# Patient Record
Sex: FEMALE | Race: WHITE | NOT HISPANIC OR LATINO
[De-identification: names, ages, dates, MRNs, and addresses within clinical notes are randomized per-mention and may not be internally consistent; named-entity substitution may affect disease eponyms.]

---

## 2021-07-07 ENCOUNTER — APPOINTMENT (OUTPATIENT)
Dept: DERMATOLOGY | Facility: CLINIC | Age: 66
End: 2021-07-07
Payer: MEDICARE

## 2021-07-07 DIAGNOSIS — L30.9 DERMATITIS, UNSPECIFIED: ICD-10-CM

## 2021-07-07 PROCEDURE — 99072 ADDL SUPL MATRL&STAF TM PHE: CPT

## 2021-07-07 PROCEDURE — 99203 OFFICE O/P NEW LOW 30 MIN: CPT

## 2021-07-07 RX ORDER — TRIAMCINOLONE ACETONIDE 1 MG/G
0.1 OINTMENT TOPICAL
Qty: 1 | Refills: 1 | Status: ACTIVE | COMMUNITY
Start: 2021-07-07 | End: 1900-01-01

## 2021-11-18 ENCOUNTER — APPOINTMENT (OUTPATIENT)
Dept: UROLOGY | Facility: CLINIC | Age: 66
End: 2021-11-18
Payer: MEDICARE

## 2021-11-18 VITALS
OXYGEN SATURATION: 100 % | TEMPERATURE: 97.3 F | HEIGHT: 64 IN | BODY MASS INDEX: 30.56 KG/M2 | DIASTOLIC BLOOD PRESSURE: 80 MMHG | WEIGHT: 179 LBS | SYSTOLIC BLOOD PRESSURE: 123 MMHG | HEART RATE: 61 BPM

## 2021-11-18 DIAGNOSIS — R31.21 ASYMPTOMATIC MICROSCOPIC HEMATURIA: ICD-10-CM

## 2021-11-18 LAB
BILIRUB UR QL STRIP: NORMAL
CLARITY UR: CLEAR
COLLECTION METHOD: NORMAL
GLUCOSE UR-MCNC: NORMAL
HCG UR QL: 0.2 EU/DL
HGB UR QL STRIP.AUTO: NORMAL
KETONES UR-MCNC: NORMAL
LEUKOCYTE ESTERASE UR QL STRIP: NORMAL
NITRITE UR QL STRIP: NORMAL
PH UR STRIP: 5.5
PROT UR STRIP-MCNC: NORMAL
SP GR UR STRIP: >=1.03

## 2021-11-18 PROCEDURE — 81003 URINALYSIS AUTO W/O SCOPE: CPT | Mod: QW

## 2021-11-18 PROCEDURE — 99204 OFFICE O/P NEW MOD 45 MIN: CPT

## 2021-11-18 PROCEDURE — 51798 US URINE CAPACITY MEASURE: CPT

## 2021-11-18 NOTE — ADDENDUM
[FreeTextEntry1] : A portion of this note was written by [Kalyn Chavez] on 11/18/2021 acting as a scribe for Dr. Hilliard. \par \par I have personally reviewed the chart and agree that the record accurately reflects my personal performance of the history, physical exam, assessment, and plan.

## 2021-11-18 NOTE — ASSESSMENT
[FreeTextEntry1] : Pt is a 65 yo F with a hx of UTIs and microscopic hematuria.  Also with hx of cystocele/sling placement in 2010, being sent for microscopic hematuria evaluation.   Microheme evaluation was discussed with the pt in detail. Today's urine was sent for culture and cytology. Pt will f/u for a CT urogram and follow-up with me for a cystoscopy in 1-2 weeks. \par \par Additionally, she will have the operative notes, (from Dr. Higgins's office), sent for my review.  \par \par Patient expressed understanding.

## 2021-11-18 NOTE — LETTER BODY
[Dear  ___] : Dear  [unfilled], [Consult Letter:] : I had the pleasure of evaluating your patient, [unfilled]. [Please see my note below.] : Please see my note below. [Consult Closing:] : Thank you very much for allowing me to participate in the care of this patient.  If you have any questions, please do not hesitate to contact me. [Sincerely,] : Sincerely, [FreeTextEntry3] : Dr. Deisy Hilliard

## 2021-11-18 NOTE — PHYSICAL EXAM
[General Appearance - Well Developed] : well developed [General Appearance - Well Nourished] : well nourished [Normal Appearance] : normal appearance [Well Groomed] : well groomed [General Appearance - In No Acute Distress] : no acute distress [Edema] : no peripheral edema [Respiration, Rhythm And Depth] : normal respiratory rhythm and effort [Exaggerated Use Of Accessory Muscles For Inspiration] : no accessory muscle use [Abdomen Soft] : soft [Abdomen Tenderness] : non-tender [Costovertebral Angle Tenderness] : no ~M costovertebral angle tenderness [Urinary Bladder Findings] : the bladder was normal on palpation [Normal Station and Gait] : the gait and station were normal for the patient's age [] : no rash [No Focal Deficits] : no focal deficits [Oriented To Time, Place, And Person] : oriented to person, place, and time [Affect] : the affect was normal [Mood] : the mood was normal [Not Anxious] : not anxious [No Palpable Adenopathy] : no palpable adenopathy [FreeTextEntry1] : no prolapse, R anterior vaginal wall fibrous on palpation likely ?benched mesh, no ALEK on SCST, non tender

## 2021-11-18 NOTE — HISTORY OF PRESENT ILLNESS
[FreeTextEntry1] : Pt is a 65 yo female  with a hx of recurrent UTIs, former pt of Dr. Higgins presenting today with microhematuria, referred by Dr. Vines.   Her pertinent surgical history includes a history of ?cystocele repair and sling with mesh in .  She has not undergone upper tract imaging or a cystoscopy in many years.  She also reports slow urinary flow which has worsened over the last 1-2 years.  Denies daytime urinary frequency.  States she drinks very little and has low urinary volume.  \par \par Dr. Vines treats her recurrent UTIs -used to occur ~9 times/year, now 1-2 times per year with use of cranberry supplements \par \par Udip: (+) moderate blood, trace omayra. \par PVR: 67 cc (to rule out incomplete bladder emptying)\par \par PMH: cerumen impaction, dermatitis\par PSH: cystocele repair/sling, broken foot, knee replacement\par FH: no  malignancies, kidney renal calculi (sister)\par SH: former smoker, social alcohol, , retired\par \par Habits:\par \par Allergies: NKDA \par Meds: clenpiq, linzess, notrofurantoin, meprazole, rabeprazole, triamcinolone

## 2021-11-22 LAB
APPEARANCE: CLEAR
BACTERIA UR CULT: NORMAL
BACTERIA: NEGATIVE
BILIRUBIN URINE: NEGATIVE
BLOOD URINE: NORMAL
COLOR: YELLOW
GLUCOSE QUALITATIVE U: NEGATIVE
HYALINE CASTS: 0 /LPF
KETONES URINE: NEGATIVE
LEUKOCYTE ESTERASE URINE: ABNORMAL
MICROSCOPIC-UA: NORMAL
NITRITE URINE: NEGATIVE
PH URINE: 6
PROTEIN URINE: NORMAL
RED BLOOD CELLS URINE: 1 /HPF
SPECIFIC GRAVITY URINE: 1.03
SQUAMOUS EPITHELIAL CELLS: 2 /HPF
UROBILINOGEN URINE: NORMAL
WHITE BLOOD CELLS URINE: 6 /HPF

## 2021-11-23 ENCOUNTER — APPOINTMENT (OUTPATIENT)
Dept: UROLOGY | Facility: CLINIC | Age: 66
End: 2021-11-23
Payer: MEDICARE

## 2021-11-23 LAB — URINE CYTOLOGY: NORMAL

## 2021-11-23 PROCEDURE — 99213 OFFICE O/P EST LOW 20 MIN: CPT | Mod: 25

## 2021-11-23 PROCEDURE — 51701 INSERT BLADDER CATHETER: CPT

## 2021-11-23 NOTE — HISTORY OF PRESENT ILLNESS
[FreeTextEntry1] : Pt is a 65 yo female  with a hx of recurrent UTIs, former pt of Dr. Higgins with microhematuria, referred by Dr. Vines. She returns today  for a straight-cath specimen following a contaminated urine culture. \par   Her pertinent surgical history includes a history of ?cystocele repair and sling with mesh in .  She has not undergone upper tract imaging or a cystoscopy in many years.  She also reports slow urinary flow which has worsened over the last 1-2 years.  Denies daytime urinary frequency.  States she drinks very little and has low urinary volume.  \par \par Dr. Vines treats her recurrent UTIs -used to occur ~9 times/year, now 1-2 times per year with use of cranberry supplements \par \par Udip: (+) moderate blood, trace omayra. \par PVR: 67 cc (to rule out incomplete bladder emptying)\par \par PMH: cerumen impaction, dermatitis\par PSH: cystocele repair/sling, broken foot, knee replacement\par FH: no  malignancies, kidney renal calculi (sister)\par SH: former smoker, social alcohol, , retired\par \par Habits:\par \par Allergies: NKDA \par Meds: clenpiq, linzess, notrofurantoin, meprazole, rabeprazole, triamcinolone

## 2021-11-23 NOTE — ASSESSMENT
[FreeTextEntry1] : Pt is a 67 yo F with a hx of UTIs and microscopic hematuria.  Also with hx of cystocele/sling placement in 2010, being sent for microscopic hematuria evaluation. Pt returns to provide a straight- cath specimen. Urine was sent for culture. Pt will f/u for a CT urogram and follow-up with me for a cystoscopy in 1-2 weeks. \par \par Additionally, she will have the operative notes, (from Dr. Higgins's office), sent for my review.  \par \par Patient expressed understanding.

## 2021-11-23 NOTE — PHYSICAL EXAM

## 2021-11-23 NOTE — ADDENDUM
[FreeTextEntry1] : A portion of this note was written by [Kalyn Chavez] on 11/23/2021 acting as a scribe for Dr. Hilliard. \par \par I have personally reviewed the chart and agree that the record accurately reflects my personal performance of the history, physical exam, assessment, and plan.

## 2021-11-29 LAB — BACTERIA UR CULT: NORMAL

## 2021-12-13 ENCOUNTER — NON-APPOINTMENT (OUTPATIENT)
Age: 66
End: 2021-12-13

## 2022-01-13 ENCOUNTER — APPOINTMENT (OUTPATIENT)
Dept: UROLOGY | Facility: CLINIC | Age: 67
End: 2022-01-13
Payer: MEDICARE

## 2022-01-13 VITALS
OXYGEN SATURATION: 97 % | SYSTOLIC BLOOD PRESSURE: 120 MMHG | TEMPERATURE: 97.1 F | HEART RATE: 59 BPM | DIASTOLIC BLOOD PRESSURE: 82 MMHG

## 2022-01-13 LAB
BILIRUB UR QL STRIP: NORMAL
CLARITY UR: CLEAR
COLLECTION METHOD: NORMAL
GLUCOSE UR-MCNC: NORMAL
HCG UR QL: 0.2 EU/DL
HGB UR QL STRIP.AUTO: NORMAL
KETONES UR-MCNC: NORMAL
LEUKOCYTE ESTERASE UR QL STRIP: NORMAL
NITRITE UR QL STRIP: NORMAL
PH UR STRIP: 5
PROT UR STRIP-MCNC: NORMAL
SP GR UR STRIP: 1.02

## 2022-01-13 PROCEDURE — 52000 CYSTOURETHROSCOPY: CPT

## 2022-01-13 PROCEDURE — 99214 OFFICE O/P EST MOD 30 MIN: CPT | Mod: 25

## 2022-01-13 PROCEDURE — 81003 URINALYSIS AUTO W/O SCOPE: CPT | Mod: QW

## 2022-01-18 LAB — BACTERIA UR CULT: NORMAL

## 2022-01-20 NOTE — HISTORY OF PRESENT ILLNESS
[FreeTextEntry1] : Pt is a 65 yo female  with a hx of recurrent UTIs, former pt of Dr. Higgins with microscopic hematuria, referred to me by Dr. Symone Vines. She returns today for a cystoscopy as part of her microscopic hematuria workup. \par   \par Her pertinent surgical history includes a history of ?cystocele repair and sling with mesh in .  She has not undergone upper tract imaging or a cystoscopy in many years.  She also reports slow urinary flow which has worsened over the last 1-2 years.  Denies daytime urinary frequency.  States she drinks very little and states she has low urinary volume.  \par \par Dr. Vines treats her recurrent UTIs -used to occur ~9 times/year, now 1-2 times per year with use of cranberry supplements \par \par Udip: (+) small omayra. \par \par PMH: cerumen impaction, dermatitis\par PSH: cystocele repair/sling, broken foot, knee replacement\par FH: no  malignancies, kidney renal calculi (sister)\par SH: former smoker, social alcohol, , retired\par \par Allergies: NKDA \par Meds: clenpiq, linzess, notrofurantoin, meprazole, rabeprazole, triamcinolone

## 2022-01-20 NOTE — ADDENDUM
[FreeTextEntry1] : A portion of this note was written by [Kalyn Chavez] on 01/13/2022 acting as a scribe for Dr. Hilliard. \par \par I have personally reviewed the chart and agree that the record accurately reflects my personal performance of the history, physical exam, assessment, and plan.

## 2022-01-20 NOTE — ASSESSMENT
[FreeTextEntry1] : Pt is a 65 yo F with a hx of UTIs and microscopic hematuria.  Also with hx of cystocele/sling placement in 2010, being sent for microscopic hematuria evaluation. Today's cystoscopy was unremarkable, no mesh was present in the bladder or urethra, no bladder lesions. She is not terribly bothered by her bladder symptoms and will f/u with me in one year or earlier if the need arises. \par \par Patient expressed understanding.

## 2022-03-25 ENCOUNTER — NON-APPOINTMENT (OUTPATIENT)
Age: 67
End: 2022-03-25

## 2022-03-25 RX ORDER — SULFAMETHOXAZOLE AND TRIMETHOPRIM 800; 160 MG/1; MG/1
800-160 TABLET ORAL
Qty: 6 | Refills: 0 | Status: ACTIVE | COMMUNITY
Start: 2022-03-25 | End: 1900-01-01

## 2022-03-28 LAB — BACTERIA UR CULT: NORMAL

## 2022-09-26 DIAGNOSIS — R30.0 DYSURIA: ICD-10-CM

## 2022-09-26 DIAGNOSIS — R35.0 FREQUENCY OF MICTURITION: ICD-10-CM

## 2022-09-26 RX ORDER — NITROFURANTOIN (MONOHYDRATE/MACROCRYSTALS) 25; 75 MG/1; MG/1
100 CAPSULE ORAL
Qty: 10 | Refills: 0 | Status: ACTIVE | COMMUNITY
Start: 2022-09-26 | End: 1900-01-01

## 2022-09-28 ENCOUNTER — NON-APPOINTMENT (OUTPATIENT)
Age: 67
End: 2022-09-28

## 2022-10-03 ENCOUNTER — NON-APPOINTMENT (OUTPATIENT)
Age: 67
End: 2022-10-03

## 2022-10-03 LAB — BACTERIA UR CULT: ABNORMAL

## 2022-10-10 ENCOUNTER — APPOINTMENT (OUTPATIENT)
Dept: NUCLEAR MEDICINE | Facility: HOSPITAL | Age: 67
End: 2022-10-10

## 2022-10-10 ENCOUNTER — OUTPATIENT (OUTPATIENT)
Dept: OUTPATIENT SERVICES | Facility: HOSPITAL | Age: 67
LOS: 1 days | End: 2022-10-10
Payer: MEDICARE

## 2022-10-10 ENCOUNTER — TRANSCRIPTION ENCOUNTER (OUTPATIENT)
Age: 67
End: 2022-10-10

## 2022-10-10 PROCEDURE — 78227 HEPATOBIL SYST IMAGE W/DRUG: CPT | Mod: 26,MH

## 2022-10-10 PROCEDURE — 78227 HEPATOBIL SYST IMAGE W/DRUG: CPT | Mod: MH

## 2022-10-10 PROCEDURE — A9537: CPT

## 2023-05-17 ENCOUNTER — APPOINTMENT (OUTPATIENT)
Dept: UROLOGY | Facility: CLINIC | Age: 68
End: 2023-05-17
Payer: MEDICARE

## 2023-05-17 VITALS
WEIGHT: 154 LBS | HEART RATE: 62 BPM | BODY MASS INDEX: 26.29 KG/M2 | SYSTOLIC BLOOD PRESSURE: 117 MMHG | TEMPERATURE: 97.7 F | OXYGEN SATURATION: 98 % | HEIGHT: 64 IN | DIASTOLIC BLOOD PRESSURE: 74 MMHG

## 2023-05-17 DIAGNOSIS — Z00.00 ENCOUNTER FOR GENERAL ADULT MEDICAL EXAMINATION W/OUT ABNORMAL FINDINGS: ICD-10-CM

## 2023-05-17 DIAGNOSIS — N39.0 URINARY TRACT INFECTION, SITE NOT SPECIFIED: ICD-10-CM

## 2023-05-17 PROCEDURE — 99214 OFFICE O/P EST MOD 30 MIN: CPT

## 2023-05-17 RX ORDER — ESTRADIOL 0.1 MG/G
0.1 CREAM VAGINAL
Qty: 1 | Refills: 5 | Status: ACTIVE | COMMUNITY
Start: 2023-05-17 | End: 1900-01-01

## 2023-05-20 ENCOUNTER — APPOINTMENT (OUTPATIENT)
Dept: UROLOGY | Facility: CLINIC | Age: 68
End: 2023-05-20
Payer: MEDICARE

## 2023-05-20 VITALS
DIASTOLIC BLOOD PRESSURE: 80 MMHG | TEMPERATURE: 97.3 F | OXYGEN SATURATION: 99 % | SYSTOLIC BLOOD PRESSURE: 125 MMHG | WEIGHT: 155 LBS | HEART RATE: 57 BPM | HEIGHT: 64 IN | BODY MASS INDEX: 26.46 KG/M2

## 2023-05-20 LAB
APPEARANCE: CLEAR
BACTERIA UR CULT: NORMAL
BACTERIA: NEGATIVE /HPF
BILIRUB UR QL STRIP: NORMAL
BILIRUBIN URINE: NEGATIVE
BLOOD URINE: ABNORMAL
CALCIUM OXALATE CRYSTALS: PRESENT
CAST: 1 /LPF
CLARITY UR: CLEAR
COLLECTION METHOD: NORMAL
COLOR: YELLOW
EPITHELIAL CELLS: 1 /HPF
GLUCOSE QUALITATIVE U: NEGATIVE MG/DL
GLUCOSE UR-MCNC: NORMAL
HCG UR QL: 0.2 EU/DL
HGB UR QL STRIP.AUTO: NORMAL
KETONES UR-MCNC: NORMAL
KETONES URINE: NEGATIVE MG/DL
LEUKOCYTE ESTERASE UR QL STRIP: NORMAL
LEUKOCYTE ESTERASE URINE: ABNORMAL
MICROSCOPIC-UA: NORMAL
NITRITE UR QL STRIP: NORMAL
NITRITE URINE: NEGATIVE
PH UR STRIP: 5.5
PH URINE: 5.5
PROT UR STRIP-MCNC: NORMAL
PROTEIN URINE: NORMAL MG/DL
RED BLOOD CELLS URINE: 16 /HPF
REVIEW: NORMAL
SP GR UR STRIP: 1.03
SPECIFIC GRAVITY URINE: 1.02
UROBILINOGEN URINE: 0.2 MG/DL
WHITE BLOOD CELLS URINE: 5 /HPF

## 2023-05-20 PROCEDURE — 99213 OFFICE O/P EST LOW 20 MIN: CPT | Mod: 25

## 2023-05-20 PROCEDURE — 81002 URINALYSIS NONAUTO W/O SCOPE: CPT

## 2023-05-20 PROCEDURE — 51701 INSERT BLADDER CATHETER: CPT

## 2023-05-20 NOTE — HISTORY OF PRESENT ILLNESS
[FreeTextEntry1] : Language: ***\par Date of First visit: ***\par Accompanied by: ***\par Contact info: ***\par Referring Provider/PCP: Dr Hilliard\par \par \par \par CC/ Problem List:\par \par ===============================================================================\par FIRST VISIT:\par The patient is a 68 year female who first presents 05/20/2023 for a UTI\par \par The pt is a patient of Dr. Hilliard being seen today for an emergent Saturday visit.\par She has a h/o UTI's treated by her PCP: she had nitrofurantoin twice and then Cipro x10 days. She felt fine and a culture was done on 5/17/2023 that showed contamination and a negative UA\par \par She presents today emergently because she has frequency and urgency and dysuria. She denies hematuria and fever.\par \par \par \par -------------------------------------------------------------------------------------------\par INTERVAL VISITS:\par \par \par ===============================================================================\par \par PMH: Bad knee\par PSH: Knee replacment, pins in ankle, bladder lift\par POBH: (if applicable)\par FH: \par \par ALL: NKDA\par MEDS: Elura and Vitamins (MVI, Calcium)\par SOC:\par \par \par ROS: Review of Systems is as per HPI unless otherwise denoted below\par \par \par ===============================================================================\par DATA: \par \par LABS:-------------------------------------------------------------------------------------------------------------------\par 11/18/2021: URine culture >3 orgs, Mod  LE, Neg NIT, 6 WBC, 1 RBC\par 11/23/2021: Urine culture negative\par \par 1/13/2022: Urine culture negative\par 3/25/2022: Urine culture negative\par \par 8/17/2022: Urine culture mixed superficial flavio\par \par 9/26/2022: sCre 0.64\par >100K Enterococcus: R: Tet, S: Vanco, Cipro, LVQ, Amp\par \par 5/17/2023: Urine culture >3 orgs, Trace LE, NEg NIT, 16 RBC, 5 WBC\par \par 5/20/2023: UA dip (straight cath) Small L, 100 Protein, Mod Blood, cloudy\par \par RADS:-------------------------------------------------------------------------------------------------------------------\par \par \par \par PATHOLOGY/CYTOLOGY:-------------------------------------------------------------------------------------------\par \par \par \par VOIDING STUDIES: ----------------------------------------------------------------------------------------------------\par \par \par \par STONE STUDIES: (Analysis/LLSA)----------------------------------------------------------------------------------\par \par \par \par PROCEDURES: -----------------------------------------------------------------------------------------------\par 5/20/2023 Under aseptic conditions she was straight cathed for urine\par about 20cc of cloudy urine was obtained\par \par \par \par ===============================================================================\par \par PHYSICAL EXAM:\par \par GEN: AAOx3, NAD, Habitus: Normal\par \par BARRIERS to CARE: none\par \par PSYCH: Appropriate Behavior, Affect Congruent\par \par HEENT: AT/NC Trachea midline. \par \par Lungs: No labored breathing.\par \par NEURO: + Movement, all 4 extremities grossly intact without deficits. No tremors.\par \par SKIN: Warm dry. No visible rashes or ulcers\par \par GAIT: Gait normal, Stability good\par \par  FOCUSED: -----------------------------------------------------------------------------------------------\par \par Ext Genitalia: Normal, Atrophic\par \par Urethra: Normal, no masses, no scarring or prolapse; No hypermobility\par \par =======================================================================================\par \par \par \par ASSESSMENT and PLAN\par \par The patient is a 68 year female with a history of the following:\par \par 1. Dysuria, urgency and frequency\par She is going on an art retreat. Her UA dip today via straight cath is not overly convincing though her urine is cloudy.\par \par She has a h/o Entorococcus in the past.\par I will stat he on LQ 250m po QD x5d\par \par The patient has no history of Parkinson's, seizure disorder (which would increase risk of seizure) or G6PDH deficiency (hemolytic anemia).\par She  is aware that the side effects of this medicine include: headaches, somnolence, seizures, sunburn, upset stomach, tendonitis, arrhythmia and crystalluria.\par The patient is not taking Quinidine, Procainmamide, Amiodarone, or Sotalol (which would increase risk of arrhythmia) or steroids (which would increase risk of tendon injury).\par The patient was instructed to take med with food in case of upset stomach but knows not to take med with milk. She also knows that the medication should be taken with plenty of water to prevent crystalluria.\par Patients with HIV are at increased risk for allergic reactions. The patient will immediately stop med and contact doctor for rash, SOB or joint pain.\par \par She will f/u with Dr Hilliard when she is back\par -----------------------------------------------------------------------------------------------------\par LABS/TESTS Ordered: YAW C&S\par Meds Ordered: LVQ 250m po QD\par Follow up: with Dr Hilliard; pt will call\par -----------------------------------------------------------------------------------------------------\par \par The total amount of time I have personally spent preparing for this visit, reviewing the patient's test results, obtaining external history, ordering tests/medications, documenting clinical information, communicating with and counseling the patient/family and/or caregiver(s), and spent face to face with the patient explaining the above was  35 minutes.\[facility] \[facility] Thank you for allowing me to assist in the care of your patient. Should you have any questions please do not hesitate to reach out to me.\par \[facility] \[facility] Tran Quevedo MD\[facility] Associate \[facility] Department of Urology\[facility] Phone: [phone]\[facility] Fax: [phone]\[facility] \[place] [address]\[facility] [address]\[facility]

## 2023-05-21 LAB
BILIRUB UR QL STRIP: NORMAL
CLARITY UR: CLEAR
COLLECTION METHOD: NORMAL
GLUCOSE UR-MCNC: NORMAL
HCG UR QL: 0.2 EU/DL
HGB UR QL STRIP.AUTO: NORMAL
KETONES UR-MCNC: NORMAL
LEUKOCYTE ESTERASE UR QL STRIP: NORMAL
NITRITE UR QL STRIP: NORMAL
PH UR STRIP: 5.5
PROT UR STRIP-MCNC: 100
SP GR UR STRIP: 1.03

## 2023-05-22 NOTE — ADDENDUM
[FreeTextEntry1] : A portion of this note was written by [Mario Dale] on 05/17/2023 acting as a scribe for Dr. Hilliard. \par \par I have personally reviewed the chart and agree that the record accurately reflects my personal performance of the history, physical exam, assessment, and plan.\par

## 2023-05-22 NOTE — HISTORY OF PRESENT ILLNESS
[FreeTextEntry1] : Ms. JULIA HENLEY comes in today for her urologic follow up. Pt is a 69 yo F with hx of recurrent UTI and microscopic hematuria. She reports of 6 recent infections in the past 6 months, for which she was treated with 2 sessions of a 7 day course of Nitrofurantoin--followed by a 10 day course (Dr. Simons)\par \par Subjectively, she is bothered by the recurrent urinary tract infections. Today she is asymptomatic but "suspects that she will have another episode of UTI in the coming weeks". \par \par Udip: 23--- small blood, small leuks\par \par Notably, she is on weight watchers, recently lost 30lbs\par \par 22---  Pt is a 65 yo female  with a hx of recurrent UTIs, former pt of Dr. Higgins with microscopic hematuria, referred to me by Dr. Symone Vines. She returns today for a cystoscopy as part of her microscopic hematuria workup. \par   \par Her pertinent surgical history includes a history of ?cystocele repair and sling with mesh in .  She has not undergone upper tract imaging or a cystoscopy in many years.  She also reports slow urinary flow which has worsened over the last 1-2 years.  Denies daytime urinary frequency.  States she drinks very little and states she has low urinary volume.  \par \par Dr. Vines treats her recurrent UTIs -used to occur ~9 times/year, now 1-2 times per year with use of cranberry supplements \par \par Udip: (+) small omayra. \par \par PMH: cerumen impaction, dermatitis\par PSH: cystocele repair/sling, broken foot, knee replacement\par FH: no  malignancies, kidney renal calculi (sister)\par SH: former smoker, social alcohol, , retired\par \par Allergies: NKDA \par Meds: clenpiq, linzess, notrofurantoin, meprazole, rabeprazole, triamcinolone

## 2023-05-22 NOTE — ASSESSMENT
[FreeTextEntry1] : I discussed the findings and options with Ms. JULIA HENLEY in detail.\par \par We had a lengthy discussed of the etiology and implications of vaginal atrophy that can be contributory to her urinary tract infections.\par \par Regarding her recurrent urinary tract infections, I reviewed the generic prevention recommendations with Ms. HENLEY , including wiping front to back and increasing her fluid intake. As these urinary tract infections have gotten more frequent after menopause, I recommended that she proceed with vaginal estrogen.\par \par  A urine culture and urinalysis are pending. \par \par For now, we agreed that she will start taking Ellura daily to prevent urinary tract infections. In addition, she will apply transvaginal estrogen cream every night for 2 weeks then decrease to x3 a week.  The Rx for estrogen cream was sent to her pharmacy. \par \par She will return in 2-3 months for a f/u visit. \par \par All of Ms. Henley's questions were answered. Patient expressed understanding.

## 2023-05-23 LAB — BACTERIA UR CULT: ABNORMAL

## 2023-05-30 ENCOUNTER — NON-APPOINTMENT (OUTPATIENT)
Age: 68
End: 2023-05-30

## 2023-08-16 ENCOUNTER — APPOINTMENT (OUTPATIENT)
Dept: UROLOGY | Facility: CLINIC | Age: 68
End: 2023-08-16
Payer: MEDICARE

## 2023-08-16 VITALS
DIASTOLIC BLOOD PRESSURE: 73 MMHG | HEART RATE: 61 BPM | OXYGEN SATURATION: 99 % | TEMPERATURE: 97.8 F | SYSTOLIC BLOOD PRESSURE: 111 MMHG

## 2023-08-16 LAB
BILIRUB UR QL STRIP: NORMAL
GLUCOSE UR-MCNC: NORMAL
HCG UR QL: 0.2 EU/DL
HGB UR QL STRIP.AUTO: NORMAL
KETONES UR-MCNC: NORMAL
LEUKOCYTE ESTERASE UR QL STRIP: NORMAL
NITRITE UR QL STRIP: NORMAL
PH UR STRIP: 5.5
PROT UR STRIP-MCNC: NORMAL
SP GR UR STRIP: 1.02

## 2023-08-16 PROCEDURE — 99213 OFFICE O/P EST LOW 20 MIN: CPT

## 2023-08-17 NOTE — ASSESSMENT
[FreeTextEntry1] : I discussed the findings and options with Ms. JULIA HENLEY in detail.  I reviewed the generic recommendations for UTI prevention with Ms. HENLEY in detail, including: wiping front to back, increasing fluid intake. She will incorporate these and understands that she should have a urine culture prior to being treated with any courses of antibiotics.   Ms. HENLEY reports to be well managed with no new urinary complaints. We agreed that she will consistently continue with Estrace for UTI prevention. She will experiment with Ellura vs. ?cranberry pill that she tried in the past to see which works best for her.   She will reach out to the office should her UTI symptoms return.    Patient expressed understanding.

## 2023-08-17 NOTE — PHYSICAL EXAM
[General Appearance - Well Developed] : well developed [General Appearance - Well Nourished] : well nourished [Normal Appearance] : normal appearance [Well Groomed] : well groomed [Abdomen Soft] : soft [General Appearance - In No Acute Distress] : no acute distress [Abdomen Tenderness] : non-tender [Costovertebral Angle Tenderness] : no ~M costovertebral angle tenderness [Urinary Bladder Findings] : the bladder was normal on palpation [] : no respiratory distress [Edema] : no peripheral edema [Respiration, Rhythm And Depth] : normal respiratory rhythm and effort [Exaggerated Use Of Accessory Muscles For Inspiration] : no accessory muscle use [Oriented To Time, Place, And Person] : oriented to person, place, and time [Affect] : the affect was normal [Mood] : the mood was normal [Not Anxious] : not anxious [Normal Station and Gait] : the gait and station were normal for the patient's age [No Focal Deficits] : no focal deficits [No Palpable Adenopathy] : no palpable adenopathy [FreeTextEntry1] : no prolapse, R anterior vaginal wall fibrous on palpation likely ?benched mesh, no ALEK on SCST, non tender

## 2023-08-17 NOTE — HISTORY OF PRESENT ILLNESS
[FreeTextEntry1] : 2023 --- Ms. JULIA HENLEY comes in today for her Urologic follow up.  Pt is a 69 yo F with hx of recurrent UTI and microscopic hematuria. She reports x5-6 infections in the past year. The positive urine culture from 23 was treated in my absence with Levaquin (Rx Dr. Quevedo). Pt reports improvement with Levaquin with no bothersome side effects. Today she presents with no new urinary complaints.  She has been using vaginal Estrace and Ellura for UTI prevention. She reports gradual improvement with Estrace, however describes Ellura "too pricey" and unsure if it helps her symptoms.   Of note, she has tried ?cranberry pill in the past but stopped.  UC: 23-- positive, E.coli  UC: 23--contaminated specimen  Dr Quevedo note: 23--- The pt is a patient of Dr. Hilliard being seen today for an emergent Saturday visit. She has a h/o UTI's treated by her PCP: she had nitrofurantoin twice and then Cipro x10 days. She felt fine and a culture was done on 2023 that showed contamination and a negative UA She presents today emergently because she has frequency and urgency and dysuria. She denies hematuria and fever.    23--- Ms. JULIA HENLEY comes in today for her urologic follow up. Pt is a 69 yo F with hx of recurrent UTI and microscopic hematuria. She reports of 6 recent infections in the past 6 months, for which she was treated with 2 sessions of a 7 day course of Nitrofurantoin--followed by a 10 day course (Dr. Simons)  Subjectively, she is bothered by the recurrent urinary tract infections. Today she is asymptomatic but "suspects that she will have another episode of UTI in the coming weeks".   Udip: 23--- small blood, small leuks  Notably, she is on weight watchers, recently lost 30lbs  22---  Pt is a 67 yo female  with a hx of recurrent UTIs, former pt of Dr. Higgins with microscopic hematuria, referred to me by Dr. Symone Vines. She returns today for a cystoscopy as part of her microscopic hematuria workup.     Her pertinent surgical history includes a history of ?cystocele repair and sling with mesh in 2010.  She has not undergone upper tract imaging or a cystoscopy in many years.  She also reports slow urinary flow which has worsened over the last 1-2 years.  Denies daytime urinary frequency.  States she drinks very little and states she has low urinary volume.    Dr. Vines treats her recurrent UTIs -used to occur ~9 times/year, now 1-2 times per year with use of cranberry supplements   Udip: (+) small omayra.   PMH: cerumen impaction, dermatitis PSH: cystocele repair/sling, broken foot, knee replacement FH: no  malignancies, kidney renal calculi (sister) SH: former smoker, social alcohol, , retired  Allergies: NKDA  Meds: clenpiq, linzess, notrofurantoin, meprazole, rabeprazole, triamcinolone

## 2023-08-17 NOTE — ADDENDUM
[FreeTextEntry1] : A portion of this note was written by [Mario Dale] on 08/16/2023 acting as a scribe for Dr. Hilliard.   I have personally reviewed the chart and agree that the record accurately reflects my personal performance of the history, physical exam, assessment, and plan.

## 2024-03-19 ENCOUNTER — APPOINTMENT (OUTPATIENT)
Dept: RADIOLOGY | Facility: CLINIC | Age: 69
End: 2024-03-19
Payer: MEDICARE

## 2024-03-19 PROCEDURE — 74240 X-RAY XM UPR GI TRC 1CNTRST: CPT

## 2024-05-04 RX ORDER — LEVOFLOXACIN 250 MG/1
250 TABLET, FILM COATED ORAL DAILY
Qty: 3 | Refills: 0 | Status: ACTIVE | COMMUNITY
Start: 2023-05-20 | End: 1900-01-01

## 2024-07-29 LAB — BACTERIA UR CULT: NORMAL

## 2024-08-13 ENCOUNTER — APPOINTMENT (OUTPATIENT)
Dept: UROLOGY | Facility: CLINIC | Age: 69
End: 2024-08-13
Payer: MEDICARE

## 2024-08-13 LAB
BILIRUB UR QL STRIP: NORMAL
CLARITY UR: NORMAL
COLLECTION METHOD: NORMAL
GLUCOSE UR-MCNC: NORMAL
HCG UR QL: 2 EU/DL
HGB UR QL STRIP.AUTO: NORMAL
KETONES UR-MCNC: NORMAL
LEUKOCYTE ESTERASE UR QL STRIP: NORMAL
NITRITE UR QL STRIP: POSITIVE
PH UR STRIP: 5
PROT UR STRIP-MCNC: NORMAL
SP GR UR STRIP: 1015

## 2024-08-13 PROCEDURE — 81003 URINALYSIS AUTO W/O SCOPE: CPT | Mod: QW

## 2024-08-13 PROCEDURE — 99214 OFFICE O/P EST MOD 30 MIN: CPT | Mod: 25

## 2024-08-13 RX ORDER — ESTRADIOL 0.1 MG/G
0.1 CREAM VAGINAL
Qty: 1 | Refills: 11 | Status: ACTIVE | COMMUNITY
Start: 2024-08-13 | End: 1900-01-01

## 2024-08-13 RX ORDER — SULFAMETHOXAZOLE AND TRIMETHOPRIM 800; 160 MG/1; MG/1
800-160 TABLET ORAL
Qty: 14 | Refills: 0 | Status: ACTIVE | COMMUNITY
Start: 2024-08-13 | End: 1900-01-01

## 2024-08-14 LAB
APPEARANCE: CLEAR
BACTERIA: ABNORMAL /HPF
BILIRUBIN URINE: NEGATIVE
BLOOD URINE: NEGATIVE
CAST: 0 /LPF
COLOR: YELLOW
EPITHELIAL CELLS: 0 /HPF
GLUCOSE QUALITATIVE U: NEGATIVE MG/DL
KETONES URINE: NEGATIVE MG/DL
LEUKOCYTE ESTERASE URINE: ABNORMAL
MICROSCOPIC-UA: NORMAL
NITRITE URINE: NEGATIVE
PH URINE: 6
PROTEIN URINE: NEGATIVE MG/DL
RED BLOOD CELLS URINE: 0 /HPF
SPECIFIC GRAVITY URINE: 1.01
UROBILINOGEN URINE: 0.2 MG/DL
WHITE BLOOD CELLS URINE: 2 /HPF

## 2024-08-14 NOTE — HISTORY OF PRESENT ILLNESS
[FreeTextEntry1] : 2024 -- 69 F with hx of recurrent UTI and microscopic hematuria. Here today for straight cath.  Reports lingering UTI symptoms- no recent improvement w levaquin by ?Dr.  Pt endorses discomfort and nocturia today. Otherwise, no significant changes to her PMH.   Reports UTI early  and took an ?abx while in [place]- reports improvement at that time and was feeling fine. She continues estrogen cream- reports no improvement with Ellura.   Not sexually active.    2023 --- Ms. JULIA HENLEY comes in today for her Urologic follow up.  Pt is a 69 yo F with hx of recurrent UTI and microscopic hematuria. She reports x5-6 infections in the past year. The positive urine culture from 23 was treated in my absence with Levaquin (Rx Dr. Quevedo). Pt reports improvement with Levaquin with no bothersome side effects. Today she presents with no new urinary complaints.  She has been using vaginal Estrace and Ellura for UTI prevention. She reports gradual improvement with Estrace, however describes Ellura "too pricey" and unsure if it helps her symptoms.   Of note, she has tried ?cranberry pill in the past but stopped.  UC: 23-- positive, E.coli  UC: 23--contaminated specimen  Dr Quevedo note: 23--- The pt is a patient of Dr. Hilliard being seen today for an emergent Saturday visit. She has a h/o UTI's treated by her PCP: she had nitrofurantoin twice and then Cipro x10 days. She felt fine and a culture was done on 2023 that showed contamination and a negative UA She presents today emergently because she has frequency and urgency and dysuria. She denies hematuria and fever.    23--- Ms. JULIA HENLEY comes in today for her urologic follow up. Pt is a 69 yo F with hx of recurrent UTI and microscopic hematuria. She reports of 6 recent infections in the past 6 months, for which she was treated with 2 sessions of a 7 day course of Nitrofurantoin--followed by a 10 day course (Dr. Simons)  Subjectively, she is bothered by the recurrent urinary tract infections. Today she is asymptomatic but "suspects that she will have another episode of UTI in the coming weeks".   Udip: 23--- small blood, small leuks  Notably, she is on weight watchers, recently lost 30lbs  22---  Pt is a 67 yo female  with a hx of recurrent UTIs, former pt of Dr. Higgins with microscopic hematuria, referred to me by Dr. Symone Vines. She returns today for a cystoscopy as part of her microscopic hematuria workup.     Her pertinent surgical history includes a history of ?cystocele repair and sling with mesh in .  She has not undergone upper tract imaging or a cystoscopy in many years.  She also reports slow urinary flow which has worsened over the last 1-2 years.  Denies daytime urinary frequency.  States she drinks very little and states she has low urinary volume.    Dr. Vines treats her recurrent UTIs -used to occur ~9 times/year, now 1-2 times per year with use of cranberry supplements   Udip: (+) small omayra.   PMH: cerumen impaction, dermatitis PSH: cystocele repair/sling, broken foot, knee replacement FH: no  malignancies, kidney renal calculi (sister) SH: former smoker, social alcohol, , retired  Allergies: NKDA  Meds: clenpiq, linzess, notrofurantoin, meprazole, rabeprazole, triamcinolone

## 2024-08-14 NOTE — ADDENDUM
[FreeTextEntry1] : A portion of this note was written by [Mario Dale] on 08/13/2024 acting as a scribe for Dr. Hilliard.   I have personally reviewed the chart and agree that the record accurately reflects my personal performance of the history, physical exam, assessment, and plan.

## 2024-08-14 NOTE — HISTORY OF PRESENT ILLNESS
[FreeTextEntry1] : 2024 -- 69 F with hx of recurrent UTI and microscopic hematuria. Here today for straight cath.  Reports lingering UTI symptoms- no recent improvement w levaquin by ?Dr.  Pt endorses discomfort and nocturia today. Otherwise, no significant changes to her PMH.   Reports UTI early  and took an ?abx while in [place]- reports improvement at that time and was feeling fine. She continues estrogen cream- reports no improvement with Ellura.   Not sexually active.    2023 --- Ms. JULIA HENLEY comes in today for her Urologic follow up.  Pt is a 67 yo F with hx of recurrent UTI and microscopic hematuria. She reports x5-6 infections in the past year. The positive urine culture from 23 was treated in my absence with Levaquin (Rx Dr. Quevedo). Pt reports improvement with Levaquin with no bothersome side effects. Today she presents with no new urinary complaints.  She has been using vaginal Estrace and Ellura for UTI prevention. She reports gradual improvement with Estrace, however describes Ellura "too pricey" and unsure if it helps her symptoms.   Of note, she has tried ?cranberry pill in the past but stopped.  UC: 23-- positive, E.coli  UC: 23--contaminated specimen  Dr Quevedo note: 23--- The pt is a patient of Dr. Hilliard being seen today for an emergent Saturday visit. She has a h/o UTI's treated by her PCP: she had nitrofurantoin twice and then Cipro x10 days. She felt fine and a culture was done on 2023 that showed contamination and a negative UA She presents today emergently because she has frequency and urgency and dysuria. She denies hematuria and fever.    23--- Ms. JULIA HENLEY comes in today for her urologic follow up. Pt is a 67 yo F with hx of recurrent UTI and microscopic hematuria. She reports of 6 recent infections in the past 6 months, for which she was treated with 2 sessions of a 7 day course of Nitrofurantoin--followed by a 10 day course (Dr. Simons)  Subjectively, she is bothered by the recurrent urinary tract infections. Today she is asymptomatic but "suspects that she will have another episode of UTI in the coming weeks".   Udip: 23--- small blood, small leuks  Notably, she is on weight watchers, recently lost 30lbs  22---  Pt is a 67 yo female  with a hx of recurrent UTIs, former pt of Dr. Higgins with microscopic hematuria, referred to me by Dr. Symone Vines. She returns today for a cystoscopy as part of her microscopic hematuria workup.     Her pertinent surgical history includes a history of ?cystocele repair and sling with mesh in .  She has not undergone upper tract imaging or a cystoscopy in many years.  She also reports slow urinary flow which has worsened over the last 1-2 years.  Denies daytime urinary frequency.  States she drinks very little and states she has low urinary volume.    Dr. Vines treats her recurrent UTIs -used to occur ~9 times/year, now 1-2 times per year with use of cranberry supplements   Udip: (+) small omayra.   PMH: cerumen impaction, dermatitis PSH: cystocele repair/sling, broken foot, knee replacement FH: no  malignancies, kidney renal calculi (sister) SH: former smoker, social alcohol, , retired  Allergies: NKDA  Meds: clenpiq, linzess, notrofurantoin, meprazole, rabeprazole, triamcinolone

## 2024-08-14 NOTE — ASSESSMENT
[FreeTextEntry1] : I discussed the findings and options with Ms. JULIA HENLEY in detail.  I reviewed the generic recommendations for UTI prevention with Ms. HENLEY in detail, including: wiping front to back, increasing fluid intake. She will incorporate these and understands that she should have a urine culture prior to being treated with any courses of antibiotics.    Reviewed implications- UTIs while travelling.  - Urine was sent for culture to r/o infection. - Start empiric course of Bactrim for 7 days. - Continue consistent use of vag estrogen cream to help decrease her UTIs.  - RBUS ordered today, and the appropriate referral/requisition was provided.   Return after RBUS to discuss results. Patient expressed understanding. - will consider repeat cystoscopy if she continues to develop UTIs.    The total amount of time I have personally spent preparing for this visit, reviewing the patient's test results, obtaining external history, ordering tests/medications, documenting clinical information, communicating with and counseling the patient/family and/or caregiver(s), reviewing old records, and spent face to face with the patient explaining the above was 35 minutes.

## 2024-08-19 LAB — BACTERIA UR CULT: ABNORMAL

## 2024-09-05 ENCOUNTER — APPOINTMENT (OUTPATIENT)
Dept: UROLOGY | Facility: CLINIC | Age: 69
End: 2024-09-05

## 2024-09-05 PROCEDURE — 99213 OFFICE O/P EST LOW 20 MIN: CPT

## 2024-09-05 NOTE — HISTORY OF PRESENT ILLNESS
[Other Location: e.g. School (Enter Location, City,State)___] : at [unfilled], at the time of the visit. [Medical Office: ([facility])___] : at the medical office located in  [Verbal consent obtained from patient] : the patient, [unfilled] [FreeTextEntry1] : 2024 -- 69 F with hx of recurrent UTI and microscopic hematuria. She presents for a telehealth f/u to discuss RBUS results.   Denies bothersome UTI today.   RBUS: 24- unremarkable, no evidence of renal stones or hydronephrosis.   2024 -- 69 F with hx of recurrent UTI and microscopic hematuria. Here today for straight cath.  Reports lingering UTI symptoms- no recent improvement w levaquin by ?Dr.  Pt endorses discomfort and nocturia today. Otherwise, no significant changes to her PMH.   Reports UTI early  and took an ?abx while in [place]- reports improvement at that time and was feeling fine. She continues estrogen cream- reports no improvement with Ellura.   Not sexually active.    2023 --- Ms. JULIA HENLEY comes in today for her Urologic follow up.  Pt is a 67 yo F with hx of recurrent UTI and microscopic hematuria. She reports x5-6 infections in the past year. The positive urine culture from 23 was treated in my absence with Levaquin (Rx Dr. Quevedo). Pt reports improvement with Levaquin with no bothersome side effects. Today she presents with no new urinary complaints.  She has been using vaginal Estrace and Ellura for UTI prevention. She reports gradual improvement with Estrace, however describes Ellura "too pricey" and unsure if it helps her symptoms.   Of note, she has tried ?cranberry pill in the past but stopped.  UC: 23-- positive, E.coli  UC: 23--contaminated specimen  Dr Quevedo note: 23--- The pt is a patient of Dr. Hilliard being seen today for an emergent Saturday visit. She has a h/o UTI's treated by her PCP: she had nitrofurantoin twice and then Cipro x10 days. She felt fine and a culture was done on 2023 that showed contamination and a negative UA She presents today emergently because she has frequency and urgency and dysuria. She denies hematuria and fever.    23--- Ms. JULIA HENLEY comes in today for her urologic follow up. Pt is a 67 yo F with hx of recurrent UTI and microscopic hematuria. She reports of 6 recent infections in the past 6 months, for which she was treated with 2 sessions of a 7 day course of Nitrofurantoin--followed by a 10 day course (Dr. Simons)  Subjectively, she is bothered by the recurrent urinary tract infections. Today she is asymptomatic but "suspects that she will have another episode of UTI in the coming weeks".   Udip: 23--- small blood, small leuks  Notably, she is on weight watchers, recently lost 30lbs  22---  Pt is a 67 yo female  with a hx of recurrent UTIs, former pt of Dr. Higgins with microscopic hematuria, referred to me by Dr. Symone Vines. She returns today for a cystoscopy as part of her microscopic hematuria workup.     Her pertinent surgical history includes a history of ?cystocele repair and sling with mesh in .  She has not undergone upper tract imaging or a cystoscopy in many years.  She also reports slow urinary flow which has worsened over the last 1-2 years.  Denies daytime urinary frequency.  States she drinks very little and states she has low urinary volume.    Dr. Vines treats her recurrent UTIs -used to occur ~9 times/year, now 1-2 times per year with use of cranberry supplements   Udip: (+) small omayra.   PMH: cerumen impaction, dermatitis PSH: cystocele repair/sling, broken foot, knee replacement FH: no  malignancies, kidney renal calculi (sister) SH: former smoker, social alcohol, , retired  Allergies: NKDA  Meds: clenpiq, linzess, notrofurantoin, meprazole, rabeprazole, triamcinolone

## 2024-09-05 NOTE — HISTORY OF PRESENT ILLNESS
[Other Location: e.g. School (Enter Location, City,State)___] : at [unfilled], at the time of the visit. [Medical Office: ([facility])___] : at the medical office located in  [Verbal consent obtained from patient] : the patient, [unfilled] [FreeTextEntry1] : 2024 -- 69 F with hx of recurrent UTI and microscopic hematuria. She presents for a telehealth f/u to discuss RBUS results.   Denies bothersome UTI today.   RBUS: 24- unremarkable, no evidence of renal stones or hydronephrosis.   2024 -- 69 F with hx of recurrent UTI and microscopic hematuria. Here today for straight cath.  Reports lingering UTI symptoms- no recent improvement w levaquin by ?Dr.  Pt endorses discomfort and nocturia today. Otherwise, no significant changes to her PMH.   Reports UTI early  and took an ?abx while in [place]- reports improvement at that time and was feeling fine. She continues estrogen cream- reports no improvement with Ellura.   Not sexually active.    2023 --- Ms. JULIA HENLEY comes in today for her Urologic follow up.  Pt is a 67 yo F with hx of recurrent UTI and microscopic hematuria. She reports x5-6 infections in the past year. The positive urine culture from 23 was treated in my absence with Levaquin (Rx Dr. Quevedo). Pt reports improvement with Levaquin with no bothersome side effects. Today she presents with no new urinary complaints.  She has been using vaginal Estrace and Ellura for UTI prevention. She reports gradual improvement with Estrace, however describes Ellura "too pricey" and unsure if it helps her symptoms.   Of note, she has tried ?cranberry pill in the past but stopped.  UC: 23-- positive, E.coli  UC: 23--contaminated specimen  Dr Quevedo note: 23--- The pt is a patient of Dr. Hilliard being seen today for an emergent Saturday visit. She has a h/o UTI's treated by her PCP: she had nitrofurantoin twice and then Cipro x10 days. She felt fine and a culture was done on 2023 that showed contamination and a negative UA She presents today emergently because she has frequency and urgency and dysuria. She denies hematuria and fever.    23--- Ms. JULIA HENLEY comes in today for her urologic follow up. Pt is a 67 yo F with hx of recurrent UTI and microscopic hematuria. She reports of 6 recent infections in the past 6 months, for which she was treated with 2 sessions of a 7 day course of Nitrofurantoin--followed by a 10 day course (Dr. Simons)  Subjectively, she is bothered by the recurrent urinary tract infections. Today she is asymptomatic but "suspects that she will have another episode of UTI in the coming weeks".   Udip: 23--- small blood, small leuks  Notably, she is on weight watchers, recently lost 30lbs  22---  Pt is a 67 yo female  with a hx of recurrent UTIs, former pt of Dr. Higgins with microscopic hematuria, referred to me by Dr. Symone Vines. She returns today for a cystoscopy as part of her microscopic hematuria workup.     Her pertinent surgical history includes a history of ?cystocele repair and sling with mesh in .  She has not undergone upper tract imaging or a cystoscopy in many years.  She also reports slow urinary flow which has worsened over the last 1-2 years.  Denies daytime urinary frequency.  States she drinks very little and states she has low urinary volume.    Dr. Vines treats her recurrent UTIs -used to occur ~9 times/year, now 1-2 times per year with use of cranberry supplements   Udip: (+) small omayra.   PMH: cerumen impaction, dermatitis PSH: cystocele repair/sling, broken foot, knee replacement FH: no  malignancies, kidney renal calculi (sister) SH: former smoker, social alcohol, , retired  Allergies: NKDA  Meds: clenpiq, linzess, notrofurantoin, meprazole, rabeprazole, triamcinolone

## 2024-09-05 NOTE — ADDENDUM
[FreeTextEntry1] : A portion of this note was written by [Mario Dale] on 09/05/2024 acting as a scribe for Dr. Hilliard.   I have personally reviewed the chart and agree that the record accurately reflects my personal performance of the history, physical exam, assessment, and plan.

## 2024-09-05 NOTE — ASSESSMENT
[FreeTextEntry1] : I discussed the findings and options with Ms. JULIA HENLEY in detail.   Reviewed RBUS 8/23/24 with patient which were unremarkable, no evidence of renal stones or hydronephrosis.  Ms. HENLEY reports to be well managed with no new urinary complaints. Denies bothersome UTI today.   - Continue estrogen cream to help decrease her UTIs.  - Will consider urodynamics as next step if still bothered by OAB.   Return for f/u as needed. Patient expressed understanding.

## 2025-03-27 LAB — BACTERIA UR CULT: ABNORMAL

## 2025-03-27 RX ORDER — CIPROFLOXACIN HYDROCHLORIDE 500 MG/1
500 TABLET, FILM COATED ORAL
Qty: 10 | Refills: 0 | Status: ACTIVE | COMMUNITY
Start: 2025-03-27 | End: 1900-01-01

## 2025-09-08 ENCOUNTER — NON-APPOINTMENT (OUTPATIENT)
Age: 70
End: 2025-09-08

## 2025-09-08 RX ORDER — CIPROFLOXACIN HYDROCHLORIDE 500 MG/1
500 TABLET, FILM COATED ORAL
Qty: 10 | Refills: 0 | Status: ACTIVE | COMMUNITY
Start: 2025-09-08 | End: 1900-01-01

## 2025-09-11 ENCOUNTER — NON-APPOINTMENT (OUTPATIENT)
Age: 70
End: 2025-09-11

## 2025-09-11 LAB — BACTERIA UR CULT: ABNORMAL

## 2025-09-11 RX ORDER — SULFAMETHOXAZOLE AND TRIMETHOPRIM 800; 160 MG/1; MG/1
800-160 TABLET ORAL
Qty: 10 | Refills: 0 | Status: ACTIVE | COMMUNITY
Start: 2025-09-11 | End: 1900-01-01